# Patient Record
Sex: FEMALE | NOT HISPANIC OR LATINO | URBAN - METROPOLITAN AREA
[De-identification: names, ages, dates, MRNs, and addresses within clinical notes are randomized per-mention and may not be internally consistent; named-entity substitution may affect disease eponyms.]

---

## 2017-03-10 ENCOUNTER — OUTPATIENT (OUTPATIENT)
Dept: OUTPATIENT SERVICES | Facility: HOSPITAL | Age: 52
LOS: 1 days | Discharge: HOME | End: 2017-03-10

## 2017-03-10 DIAGNOSIS — C50.912 MALIGNANT NEOPLASM OF UNSPECIFIED SITE OF LEFT FEMALE BREAST: ICD-10-CM

## 2017-03-13 ENCOUNTER — RX RENEWAL (OUTPATIENT)
Age: 52
End: 2017-03-13

## 2017-03-24 ENCOUNTER — EMERGENCY (EMERGENCY)
Facility: HOSPITAL | Age: 52
LOS: 1 days | Discharge: HOME | End: 2017-03-24
Attending: EMERGENCY MEDICINE | Admitting: EMERGENCY MEDICINE
Payer: COMMERCIAL

## 2017-03-24 VITALS
HEART RATE: 98 BPM | SYSTOLIC BLOOD PRESSURE: 121 MMHG | DIASTOLIC BLOOD PRESSURE: 86 MMHG | RESPIRATION RATE: 18 BRPM | OXYGEN SATURATION: 99 % | TEMPERATURE: 98 F

## 2017-03-24 VITALS
HEIGHT: 68 IN | WEIGHT: 158.07 LBS | TEMPERATURE: 99 F | HEART RATE: 92 BPM | DIASTOLIC BLOOD PRESSURE: 84 MMHG | RESPIRATION RATE: 17 BRPM | OXYGEN SATURATION: 100 % | SYSTOLIC BLOOD PRESSURE: 164 MMHG

## 2017-03-24 DIAGNOSIS — M54.9 DORSALGIA, UNSPECIFIED: ICD-10-CM

## 2017-03-24 DIAGNOSIS — Z98.890 OTHER SPECIFIED POSTPROCEDURAL STATES: Chronic | ICD-10-CM

## 2017-03-24 PROCEDURE — 99283 EMERGENCY DEPT VISIT LOW MDM: CPT

## 2017-03-24 RX ORDER — IBUPROFEN 200 MG
600 TABLET ORAL ONCE
Qty: 0 | Refills: 0 | Status: COMPLETED | OUTPATIENT
Start: 2017-03-24 | End: 2017-03-24

## 2017-03-24 RX ORDER — TAMOXIFEN CITRATE 20 MG/1
0 TABLET, FILM COATED ORAL
Qty: 0 | Refills: 0 | COMMUNITY

## 2017-03-24 RX ORDER — ACETAMINOPHEN 500 MG
975 TABLET ORAL ONCE
Qty: 0 | Refills: 0 | Status: COMPLETED | OUTPATIENT
Start: 2017-03-24 | End: 2017-03-24

## 2017-03-24 RX ADMIN — Medication 600 MILLIGRAM(S): at 19:18

## 2017-03-24 RX ADMIN — Medication 975 MILLIGRAM(S): at 19:18
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
## 2017-03-24 NOTE — ED PROVIDER NOTE - PLAN OF CARE
You may take 600mg of ibuprofen (example: motrin or advil) every 4-6 hours for baseline pain control as indicated with respect to the warnings on the label. This is an over the counter medication.  You may take 1000mg of tylenol every 6 hours for baseline pain control with respect to the warnings on the label  Follow up with your primary care doctor within 48-72 hours.   You must return for new, worsening or concerning symptoms; specifically including those listed on the attached sheet.
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
## 2017-03-24 NOTE — ED PROVIDER NOTE - MUSCULOSKELETAL, MLM
Spine appears normal, range of motion is not limited, paraspinal muscle tenderness t10-l2 with no midline tend or step off.

## 2017-04-14 ENCOUNTER — OUTPATIENT (OUTPATIENT)
Dept: OUTPATIENT SERVICES | Facility: HOSPITAL | Age: 52
LOS: 1 days | Discharge: HOME | End: 2017-04-14

## 2017-04-14 DIAGNOSIS — C50.912 MALIGNANT NEOPLASM OF UNSPECIFIED SITE OF LEFT FEMALE BREAST: ICD-10-CM

## 2017-04-14 DIAGNOSIS — Z98.890 OTHER SPECIFIED POSTPROCEDURAL STATES: Chronic | ICD-10-CM

## 2017-04-18 ENCOUNTER — APPOINTMENT (OUTPATIENT)
Dept: HEMATOLOGY ONCOLOGY | Facility: CLINIC | Age: 52
End: 2017-04-18

## 2017-04-18 VITALS
DIASTOLIC BLOOD PRESSURE: 77 MMHG | TEMPERATURE: 98.4 F | RESPIRATION RATE: 16 BRPM | SYSTOLIC BLOOD PRESSURE: 123 MMHG | WEIGHT: 158.51 LBS | OXYGEN SATURATION: 96 % | HEART RATE: 74 BPM

## 2017-04-18 DIAGNOSIS — Z98.890 OTHER SPECIFIED POSTPROCEDURAL STATES: ICD-10-CM

## 2017-04-18 RX ORDER — APREMILAST 30 MG/1
30 TABLET, FILM COATED ORAL
Refills: 0 | Status: ACTIVE | COMMUNITY
Start: 2017-04-18

## 2017-10-19 ENCOUNTER — OUTPATIENT (OUTPATIENT)
Dept: OUTPATIENT SERVICES | Facility: HOSPITAL | Age: 52
LOS: 1 days | Discharge: HOME | End: 2017-10-19

## 2017-10-19 DIAGNOSIS — Z98.890 OTHER SPECIFIED POSTPROCEDURAL STATES: Chronic | ICD-10-CM

## 2017-10-19 DIAGNOSIS — C50.912 MALIGNANT NEOPLASM OF UNSPECIFIED SITE OF LEFT FEMALE BREAST: ICD-10-CM

## 2017-10-24 ENCOUNTER — APPOINTMENT (OUTPATIENT)
Dept: HEMATOLOGY ONCOLOGY | Facility: CLINIC | Age: 52
End: 2017-10-24
Payer: COMMERCIAL

## 2017-10-24 VITALS
WEIGHT: 162.04 LBS | HEART RATE: 84 BPM | OXYGEN SATURATION: 100 % | SYSTOLIC BLOOD PRESSURE: 116 MMHG | RESPIRATION RATE: 16 BRPM | TEMPERATURE: 98.6 F | DIASTOLIC BLOOD PRESSURE: 82 MMHG

## 2017-10-24 PROCEDURE — 99214 OFFICE O/P EST MOD 30 MIN: CPT

## 2017-10-24 RX ORDER — ALPRAZOLAM 0.25 MG/1
0.25 TABLET ORAL
Qty: 120 | Refills: 0 | Status: ACTIVE | COMMUNITY
Start: 2017-04-24

## 2017-10-24 RX ORDER — CIPROFLOXACIN HYDROCHLORIDE 500 MG/1
500 TABLET, FILM COATED ORAL
Qty: 10 | Refills: 0 | Status: DISCONTINUED | COMMUNITY
Start: 2017-04-24

## 2017-10-24 RX ORDER — AZITHROMYCIN 250 MG/1
250 TABLET, FILM COATED ORAL
Qty: 6 | Refills: 0 | Status: DISCONTINUED | COMMUNITY
Start: 2017-04-24

## 2018-04-06 ENCOUNTER — RX RENEWAL (OUTPATIENT)
Age: 53
End: 2018-04-06

## 2018-04-09 ENCOUNTER — RX RENEWAL (OUTPATIENT)
Age: 53
End: 2018-04-09

## 2018-04-20 ENCOUNTER — OUTPATIENT (OUTPATIENT)
Dept: OUTPATIENT SERVICES | Facility: HOSPITAL | Age: 53
LOS: 1 days | Discharge: HOME | End: 2018-04-20

## 2018-04-20 DIAGNOSIS — Z98.890 OTHER SPECIFIED POSTPROCEDURAL STATES: Chronic | ICD-10-CM

## 2018-04-20 DIAGNOSIS — C50.912 MALIGNANT NEOPLASM OF UNSPECIFIED SITE OF LEFT FEMALE BREAST: ICD-10-CM

## 2018-04-24 ENCOUNTER — APPOINTMENT (OUTPATIENT)
Dept: HEMATOLOGY ONCOLOGY | Facility: CLINIC | Age: 53
End: 2018-04-24
Payer: COMMERCIAL

## 2018-04-24 VITALS
OXYGEN SATURATION: 100 % | TEMPERATURE: 98.6 F | HEART RATE: 82 BPM | DIASTOLIC BLOOD PRESSURE: 70 MMHG | WEIGHT: 163.14 LBS | RESPIRATION RATE: 16 BRPM | SYSTOLIC BLOOD PRESSURE: 120 MMHG

## 2018-04-24 PROCEDURE — 99214 OFFICE O/P EST MOD 30 MIN: CPT

## 2018-07-16 PROBLEM — C50.919 MALIGNANT NEOPLASM OF UNSPECIFIED SITE OF UNSPECIFIED FEMALE BREAST: Chronic | Status: ACTIVE | Noted: 2017-03-24

## 2018-10-04 ENCOUNTER — OUTPATIENT (OUTPATIENT)
Dept: OUTPATIENT SERVICES | Facility: HOSPITAL | Age: 53
LOS: 1 days | Discharge: HOME | End: 2018-10-04

## 2018-10-04 DIAGNOSIS — Z98.890 OTHER SPECIFIED POSTPROCEDURAL STATES: Chronic | ICD-10-CM

## 2018-10-04 DIAGNOSIS — C50.912 MALIGNANT NEOPLASM OF UNSPECIFIED SITE OF LEFT FEMALE BREAST: ICD-10-CM

## 2018-10-12 ENCOUNTER — APPOINTMENT (OUTPATIENT)
Dept: HEMATOLOGY ONCOLOGY | Facility: CLINIC | Age: 53
End: 2018-10-12
Payer: COMMERCIAL

## 2018-10-12 VITALS
HEART RATE: 70 BPM | DIASTOLIC BLOOD PRESSURE: 85 MMHG | TEMPERATURE: 98.7 F | OXYGEN SATURATION: 99 % | WEIGHT: 163.14 LBS | SYSTOLIC BLOOD PRESSURE: 132 MMHG | RESPIRATION RATE: 16 BRPM

## 2018-10-12 PROCEDURE — 99213 OFFICE O/P EST LOW 20 MIN: CPT

## 2019-02-04 ENCOUNTER — RX RENEWAL (OUTPATIENT)
Age: 54
End: 2019-02-04

## 2019-03-11 ENCOUNTER — RX RENEWAL (OUTPATIENT)
Age: 54
End: 2019-03-11

## 2019-04-05 ENCOUNTER — OUTPATIENT (OUTPATIENT)
Dept: OUTPATIENT SERVICES | Facility: HOSPITAL | Age: 54
LOS: 1 days | Discharge: HOME | End: 2019-04-05

## 2019-04-05 DIAGNOSIS — Z98.890 OTHER SPECIFIED POSTPROCEDURAL STATES: Chronic | ICD-10-CM

## 2019-04-05 DIAGNOSIS — C50.912 MALIGNANT NEOPLASM OF UNSPECIFIED SITE OF LEFT FEMALE BREAST: ICD-10-CM

## 2019-04-16 ENCOUNTER — APPOINTMENT (OUTPATIENT)
Dept: HEMATOLOGY ONCOLOGY | Facility: CLINIC | Age: 54
End: 2019-04-16
Payer: COMMERCIAL

## 2019-04-16 VITALS
TEMPERATURE: 98.6 F | RESPIRATION RATE: 16 BRPM | WEIGHT: 164.9 LBS | OXYGEN SATURATION: 99 % | HEART RATE: 77 BPM | SYSTOLIC BLOOD PRESSURE: 129 MMHG | DIASTOLIC BLOOD PRESSURE: 89 MMHG

## 2019-04-16 PROCEDURE — 99213 OFFICE O/P EST LOW 20 MIN: CPT

## 2019-04-16 RX ORDER — MAGNESIUM OXIDE 400 MG
400 (241.3 MG) TABLET ORAL
Refills: 0 | Status: ACTIVE | COMMUNITY
Start: 2019-04-16

## 2019-04-16 RX ORDER — CALCIPOTRIENE AND BETAMETHASONE DIPROPIONATE 50; .5 UG/G; MG/G
0.005-0.064 OINTMENT TOPICAL
Refills: 0 | Status: ACTIVE | COMMUNITY
Start: 2019-04-16

## 2019-04-16 RX ORDER — CLOBETASOL PROPIONATE 0.5 MG/G
0.05 CREAM TOPICAL
Refills: 0 | Status: ACTIVE | COMMUNITY
Start: 2019-04-16

## 2019-04-16 NOTE — OB HISTORY
[Menarche Age: ____] : age at menarche was [unfilled] [Definite:  ___ (Date)] : the last menstrual period was [unfilled] [Menopause Age: ____] : age at menopause was [unfilled] [___] : Full Term: [unfilled]

## 2019-04-19 NOTE — PHYSICAL EXAM
[Fully active, able to carry on all pre-disease performance without restriction] : Status 0 - Fully active, able to carry on all pre-disease performance without restriction [Normal] : grossly intact [de-identified] : Right eye mydriasis  [de-identified] : Areas of psoriasis and a couple of patches of eczema

## 2019-04-19 NOTE — HISTORY OF PRESENT ILLNESS
[Disease: _____________________] : Disease: [unfilled] [T: ___] : T[unfilled] [N: ___] : N[unfilled] [M: ___] : M[unfilled] [AJCC Stage: ____] : AJCC Stage: [unfilled] [de-identified] : Left breast cancer at 49\par 6/25/14 Left lumpectomy and SLN\par s/p Radiation\par 11/14 started Tamoxifen \par 2014-My Gene genetic panel negative including BRCA [de-identified] : DCIS with minute focus of microinvasion, 4 SLN negative, ER 20% CA negative [de-identified] : Lindsey continues to do well on tamoxifen which she has been on since 11/14, aside from the occasional lower extremity muscle cramps which are better with start of magnesium supplementations. The hot flashes are more sporadic and have been less intense in nature.\par LMP 10/18 and nothing since, cycles are now very irregular with  typical bleeding duration (not too heavy)\par Her psoriasis has been under control with Otezla and two topical creams, she was however referred to see a rheumatologist as she may have some arthritic component of the psoriasis that needs to be managed. \par \par Routine Health Maintenance:\par Mammogram and breast ultrasound:05/18 MICHAEL\par MRI of the breast: 05/18 stable post lumpectomy changes in the left breast. No evidence of malignancy in either breast. \par Pap Smear: 7/18 MICHAEL\par Colonoscopy: 2014 with no polyps next follow up 8/22/19 Otezla may effect the colon as per Dermatology\par \par \par

## 2019-08-30 ENCOUNTER — RX RENEWAL (OUTPATIENT)
Age: 54
End: 2019-08-30

## 2019-10-12 ENCOUNTER — OUTPATIENT (OUTPATIENT)
Dept: OUTPATIENT SERVICES | Facility: HOSPITAL | Age: 54
LOS: 1 days | Discharge: HOME | End: 2019-10-12

## 2019-10-12 DIAGNOSIS — Z98.890 OTHER SPECIFIED POSTPROCEDURAL STATES: Chronic | ICD-10-CM

## 2019-10-12 DIAGNOSIS — C50.912 MALIGNANT NEOPLASM OF UNSPECIFIED SITE OF LEFT FEMALE BREAST: ICD-10-CM

## 2019-10-18 ENCOUNTER — APPOINTMENT (OUTPATIENT)
Dept: HEMATOLOGY ONCOLOGY | Facility: CLINIC | Age: 54
End: 2019-10-18
Payer: COMMERCIAL

## 2019-10-18 VITALS
WEIGHT: 162.04 LBS | OXYGEN SATURATION: 99 % | DIASTOLIC BLOOD PRESSURE: 80 MMHG | SYSTOLIC BLOOD PRESSURE: 118 MMHG | TEMPERATURE: 98.5 F | RESPIRATION RATE: 16 BRPM | HEART RATE: 75 BPM

## 2019-10-18 DIAGNOSIS — L40.9 PSORIASIS, UNSPECIFIED: ICD-10-CM

## 2019-10-18 DIAGNOSIS — R25.2 CRAMP AND SPASM: ICD-10-CM

## 2019-10-18 PROCEDURE — 99214 OFFICE O/P EST MOD 30 MIN: CPT

## 2019-10-18 NOTE — PHYSICAL EXAM
[de-identified] : Right eye mydriasis  [de-identified] : Areas of psoriasis and a couple of patches of eczema

## 2019-10-18 NOTE — HISTORY OF PRESENT ILLNESS
[de-identified] : Left breast cancer at 49\par 6/25/14 Left lumpectomy and SLN\par s/p Radiation\par 2014 My Gene genetic panel negative including BRCA\par 11/14 -11/19 Tamoxifen for five years\par  [de-identified] : DCIS with minute focus of microinvasion, 4 SLN negative, ER 20% WA negative [de-identified] : Lindsey continues to do well on tamoxifen which she has been on since 11/14. She rarely gets the hot flashes now and only has occasional lower extremity muscle cramps which are better with magnesium supplementation.\par LMP 10/18 and one more in 7/19 which was very light.\par Her psoriasis has been under control with Otezla and two topical creams, but continues to flare at times with stress.\par She is  with two stepchildren.\par \par Routine Health Maintenance:\par Mammogram and breast ultrasound:05/19 MICHAEL\par MRI of the breast: 05/18 stable post lumpectomy changes in the left breast. No evidence of malignancy in either breast. \par Pap Smear: 7/19 MICHAEL\par Colonoscopy: 2014 with no polyps; 9/2019 no polyps\par \par \par

## 2019-12-16 ENCOUNTER — APPOINTMENT (OUTPATIENT)
Dept: SPINE | Facility: CLINIC | Age: 54
End: 2019-12-16
Payer: COMMERCIAL

## 2019-12-16 VITALS
DIASTOLIC BLOOD PRESSURE: 74 MMHG | BODY MASS INDEX: 24.55 KG/M2 | OXYGEN SATURATION: 99 % | WEIGHT: 162 LBS | HEART RATE: 82 BPM | HEIGHT: 68 IN | TEMPERATURE: 97.8 F | SYSTOLIC BLOOD PRESSURE: 116 MMHG

## 2019-12-16 DIAGNOSIS — Z78.9 OTHER SPECIFIED HEALTH STATUS: ICD-10-CM

## 2019-12-16 DIAGNOSIS — Z72.89 OTHER PROBLEMS RELATED TO LIFESTYLE: ICD-10-CM

## 2019-12-16 DIAGNOSIS — M54.42 LUMBAGO WITH SCIATICA, LEFT SIDE: ICD-10-CM

## 2019-12-16 DIAGNOSIS — M54.5 LOW BACK PAIN: ICD-10-CM

## 2019-12-16 DIAGNOSIS — Z85.9 PERSONAL HISTORY OF MALIGNANT NEOPLASM, UNSPECIFIED: ICD-10-CM

## 2019-12-16 PROCEDURE — 99203 OFFICE O/P NEW LOW 30 MIN: CPT

## 2019-12-16 NOTE — ASSESSMENT
[FreeTextEntry1] : 55 yo female with a small disc bulge and unlikely to be causing her lower spine pain.  She was encouraged to continue conservative measures and obtain a scoliosis image .  Normal neurological exam. No surgery recommended at the present time.  She will return after scoliosis images.
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
## 2019-12-16 NOTE — PHYSICAL EXAM
[Person] : oriented to person [Place] : oriented to place [Time] : oriented to time [Short Term Intact] : short term memory intact [Remote Intact] : remote memory intact [Span Intact] : the attention span was normal [Concentration Intact] : normal concentrating ability [Fluency] : fluency intact [Comprehension] : comprehension intact [Current Events] : adequate knowledge of current events [Past History] : adequate knowledge of personal past history [Vocabulary] : adequate range of vocabulary [Cranial Nerves Optic (II)] : visual acuity intact bilaterally,  pupils equal round and reactive to light [Cranial Nerves Oculomotor (III)] : extraocular motion intact [Cranial Nerves Trigeminal (V)] : facial sensation intact symmetrically [Cranial Nerves Facial (VII)] : face symmetrical [Cranial Nerves Vestibulocochlear (VIII)] : hearing was intact bilaterally [Cranial Nerves Glossopharyngeal (IX)] : tongue and palate midline [Cranial Nerves Accessory (XI - Cranial And Spinal)] : head turning and shoulder shrug symmetric [Cranial Nerves Hypoglossal (XII)] : there was no tongue deviation with protrusion [Motor Tone] : muscle tone was normal in all four extremities [Motor Strength] : muscle strength was normal in all four extremities [No Muscle Atrophy] : normal bulk in all four extremities [Sensation Tactile Decrease] : light touch was intact [Abnormal Walk] : normal gait [Past-pointing] : there was no past-pointing [Balance] : balance was intact [Tremor] : no tremor present [2+] : Ankle jerk left 2+ [Plantar Reflex Right Only] : normal on the right [Plantar Reflex Left Only] : normal on the left [___] : absent on the right [___] : absent on the left [Pearson] : Pearson's sign was not demonstrated [No Tenderness to Palpation] : no spine tenderness on palpation [Full ROM] : full ROM [No Pain with ROM] : no pain with motion in any direction [Straight-Leg Raise Test - Right] : straight leg raise  of the right leg was negative [Able to toe walk] : the patient was able to toe walk [Straight-Leg Raise Test - Left] : straight leg raise of the left leg was negative [Able to heel walk] : the patient was able to heel walk

## 2019-12-16 NOTE — DATA REVIEWED
[de-identified] : Lumbar MRI from [facility] 11/5/2019 [de-identified] : Lumbar Xray from 2017 at [facility] [de-identified] : EMG of LE from 2017

## 2019-12-16 NOTE — REASON FOR VISIT
[New Patient Visit] : a new patient visit [Referred By: _________] : Patient was referred by JENNY [Family Member] : family member [FreeTextEntry1] : Lumbar radiculopathy

## 2019-12-16 NOTE — CONSULT LETTER
[Dear  ___] : Dear  [unfilled], [Consult Letter:] : I had the pleasure of evaluating your patient, [unfilled]. [Please see my note below.] : Please see my note below. [Consult Closing:] : Thank you very much for allowing me to participate in the care of this patient.  If you have any questions, please do not hesitate to contact me. [Sincerely,] : Sincerely, [FreeTextEntry2] : Adams Rojas MD\par [address] [place] [address]\par [place], [place]   [FreeTextEntry3] : Meir Low MD\par Chief of Neurosurgery [facility]\par [facility]\par

## 2020-01-16 ENCOUNTER — APPOINTMENT (OUTPATIENT)
Dept: SPINE | Facility: CLINIC | Age: 55
End: 2020-01-16

## 2020-01-17 ENCOUNTER — TRANSCRIPTION ENCOUNTER (OUTPATIENT)
Age: 55
End: 2020-01-17

## 2020-05-21 ENCOUNTER — TRANSCRIPTION ENCOUNTER (OUTPATIENT)
Age: 55
End: 2020-05-21

## 2020-10-22 ENCOUNTER — TRANSCRIPTION ENCOUNTER (OUTPATIENT)
Age: 55
End: 2020-10-22

## 2020-11-06 ENCOUNTER — TRANSCRIPTION ENCOUNTER (OUTPATIENT)
Age: 55
End: 2020-11-06

## 2020-11-09 ENCOUNTER — TRANSCRIPTION ENCOUNTER (OUTPATIENT)
Age: 55
End: 2020-11-09

## 2021-10-13 ENCOUNTER — TRANSCRIPTION ENCOUNTER (OUTPATIENT)
Age: 56
End: 2021-10-13

## 2022-01-14 ENCOUNTER — TRANSCRIPTION ENCOUNTER (OUTPATIENT)
Age: 57
End: 2022-01-14

## 2022-01-19 ENCOUNTER — TRANSCRIPTION ENCOUNTER (OUTPATIENT)
Age: 57
End: 2022-01-19

## 2022-09-14 ENCOUNTER — NON-APPOINTMENT (OUTPATIENT)
Age: 57
End: 2022-09-14

## 2023-02-10 ENCOUNTER — APPOINTMENT (OUTPATIENT)
Dept: PLASTIC SURGERY | Facility: CLINIC | Age: 58
End: 2023-02-10
Payer: COMMERCIAL

## 2023-02-10 PROCEDURE — 99213 OFFICE O/P EST LOW 20 MIN: CPT

## 2023-02-10 NOTE — PHYSICAL EXAM
[Normocephalic] : normocephalic [Atraumatic] : atraumatic [EOMI] : extra ocular movement intact [Sclera nonicteric] : sclera nonicteric [Supple] : supple [No Supraclavicular Adenopathy] : no supraclavicular adenopathy [No Cervical Adenopathy] : no cervical adenopathy [No Thyromegaly] : no thyromegaly [Examined in the supine and seated position] : examined in the supine and seated position [No dominant masses] : no dominant masses in right breast  [No dominant masses] : no dominant masses left breast [No Nipple Retraction] : no left nipple retraction [No Nipple Discharge] : no left nipple discharge [No Axillary Lymphadenopathy] : no left axillary lymphadenopathy [No Edema] : no edema [No Rashes] : no rashes [No Ulceration] : no ulceration [Asymmetrical] : asymmetrical [de-identified] : Right breast is larger than the left.  [de-identified] : Left breast and axillary incisions well healed.

## 2023-02-10 NOTE — ASSESSMENT
[FreeTextEntry1] : history of left breast cancer.\par No evidence of disease recurrence \par \par 1. Annual bilateral mammogram and breast ultrasound due 7/2023. Rx provided. \par 2. Follow up office visit due 8/2023. \par 3. List of bra boutiques provided. \par 4. Advised monthly self breast examinations and advised her to contact me if she has any concerns.  wine/liquor/beer/"Socially"

## 2023-02-10 NOTE — CONSULT LETTER
[Dear  ___] : Dear  [unfilled], [Courtesy Letter:] : I had the pleasure of seeing your patient, [unfilled], in my office today. [Please see my note below.] : Please see my note below. [Sincerely,] : Sincerely, [DrCindy  ___] : Dr. ALVARADO

## 2023-02-10 NOTE — HISTORY OF PRESENT ILLNESS
[FreeTextEntry1] : 57 year old female presents breast cancer recurrence surveillance.\par She has a history of left breast cancer (Stage 1A, 6/2014); s/p left lumpectomy, left axillary sentinel LN biopsy (6/18/14) then left re-excision lumpectomy (7/16/2014)\par She has a family history of breast cancer in her mother (81), sister (51), and maternal grandmother (80's). She has a family history of uterine cancer in a different sister (53) and prostate cancer in a cousin (51).\par 7/12/2021 Bilateral mammogram: There is motion on the left mL of view. Recommend repeat left mL of view at no additional cost. No significant masses, calcifications, or other findings are seen in either breast within this limitation. There are post treatment changes left breast. BI-RADS 0. \par Bilateral breast ultrasound: Post surgical changes Left 2:00.\par 8/05/2021 Left diagnostic mammogram: negative, BI-RADS 2.\par 7/18/2022 Bilateral mammogram: There are scattered areas of fibroglandular density. There is a stable post lumpectomy scar in the central left breast. No significant masses or calcifications are identified. Nonenlarged bilateral axillary lymph nodes are present. BI-RADS 2.\par Bilateral breast ultrasound: Right breast - No cystic or solid lesions identified.\par Left breast - 2:00, 5cm from the nipple, stable area of post lumpectomy scar. No cystic or solid lesions identified. There are no enlarged axillary lymph nodes on either side.\par There is no mammographic or sonographic evidence of malignancy in either breast. BI-RADS 1.\par ONC: Dr. Amezcua, completed 5 years of Tamoxifen 10/2019; she no longer sees her.\par XRT: Dr. Gee: s/p XRT\par No change in health history since last office visit.\par No new breast concerns.

## 2023-05-22 ENCOUNTER — NON-APPOINTMENT (OUTPATIENT)
Age: 58
End: 2023-05-22

## 2023-07-31 ENCOUNTER — NON-APPOINTMENT (OUTPATIENT)
Age: 58
End: 2023-07-31

## 2023-08-03 ENCOUNTER — APPOINTMENT (OUTPATIENT)
Dept: PLASTIC SURGERY | Facility: CLINIC | Age: 58
End: 2023-08-03
Payer: COMMERCIAL

## 2023-08-04 ENCOUNTER — NON-APPOINTMENT (OUTPATIENT)
Age: 58
End: 2023-08-04

## 2023-08-04 DIAGNOSIS — R92.8 OTHER ABNORMAL AND INCONCLUSIVE FINDINGS ON DIAGNOSTIC IMAGING OF BREAST: ICD-10-CM

## 2023-09-06 ENCOUNTER — APPOINTMENT (OUTPATIENT)
Dept: PLASTIC SURGERY | Facility: CLINIC | Age: 58
End: 2023-09-06
Payer: COMMERCIAL

## 2023-09-06 VITALS
SYSTOLIC BLOOD PRESSURE: 136 MMHG | HEIGHT: 68 IN | TEMPERATURE: 97.6 F | OXYGEN SATURATION: 100 % | BODY MASS INDEX: 24.71 KG/M2 | DIASTOLIC BLOOD PRESSURE: 90 MMHG | WEIGHT: 163 LBS | HEART RATE: 61 BPM

## 2023-09-06 DIAGNOSIS — Z80.3 FAMILY HISTORY OF MALIGNANT NEOPLASM OF BREAST: ICD-10-CM

## 2023-09-06 PROCEDURE — 99213 OFFICE O/P EST LOW 20 MIN: CPT

## 2023-09-06 NOTE — PHYSICAL EXAM
[de-identified] : Right breast is larger than the left.  [de-identified] : Left breast and axillary incisions well healed.

## 2023-09-06 NOTE — ASSESSMENT
[FreeTextEntry1] : history of left breast cancer (Stage IA, 6/2014) No evidence of disease recurrence   1. Annual bilateral mammogram and breast ultrasound due 7/2024 2. Follow up office visit due 3/2024 3. Advised monthly self breast examinations and advised her to contact me if she has any concerns.

## 2023-09-06 NOTE — CONSULT LETTER
[FreeTextEntry3] : Mirlande Mckeon, RPA-C Breast Surgery [address] [place] [address], [place] [address] (Phone) [phone] (Fax) [phone]

## 2023-09-06 NOTE — HISTORY OF PRESENT ILLNESS
[FreeTextEntry1] : Patient is a 58 year old female here today for a follow up visit.  She has a history of left breast cancer (Stage 1A, 6/2014); s/p left lumpectomy, left axillary sentinel LN biopsy (6/18/14) then left re-excision lumpectomy (7/16/2014) She has a family history of breast cancer in her mother (81), sister (51), sister (58) and maternal grandmother (80's). She has a family history of uterine cancer in a different sister (53), eye cancer in a different sister and prostate cancer in a cousin (51). 7/12/2021 Bilateral mammogram: There is motion on the left mL of view. Recommend repeat left mL of view at no additional cost. No significant masses, calcifications, or other findings are seen in either breast within this limitation. There are post treatment changes left breast. BI-RADS 0.  Bilateral breast ultrasound: Post surgical changes Left 2:00. 8/05/2021 Left diagnostic mammogram: negative, BI-RADS 2. 7/28/2023 Bilateral mammogram: There are scattered areas of fibroglandular density. Stable post surgical changes left breast. right asymmetry at the level of the nipple, posterior depth. Bilateral breast ultrasound: Left stable lumpectomy scar. BI-RADS 0, advise left diagnostic mammogram with targeted ultrasound 8/8/2023 Left diagnostic ultrasound: Asymmetry in the right central breast persists with additional imaging but appears to demonstrate long-term stability from 2018 Left targeted ultrasound: Negative.  BI-RADS 2 ONC: Dr. Amezcua, completed 5 years of Tamoxifen 10/2019; she no longer sees her. XRT: Dr. Gee: s/p XRT She denies any current breast concerns

## 2024-03-25 ENCOUNTER — NON-APPOINTMENT (OUTPATIENT)
Age: 59
End: 2024-03-25

## 2024-03-26 ENCOUNTER — APPOINTMENT (OUTPATIENT)
Dept: PLASTIC SURGERY | Facility: CLINIC | Age: 59
End: 2024-03-26
Payer: COMMERCIAL

## 2024-03-26 VITALS
HEIGHT: 68 IN | BODY MASS INDEX: 24.71 KG/M2 | TEMPERATURE: 98 F | HEART RATE: 69 BPM | SYSTOLIC BLOOD PRESSURE: 121 MMHG | DIASTOLIC BLOOD PRESSURE: 77 MMHG | WEIGHT: 163 LBS | OXYGEN SATURATION: 95 %

## 2024-03-26 DIAGNOSIS — C50.912 MALIGNANT NEOPLASM OF UNSPECIFIED SITE OF LEFT FEMALE BREAST: ICD-10-CM

## 2024-03-26 PROCEDURE — 99213 OFFICE O/P EST LOW 20 MIN: CPT

## 2024-03-26 NOTE — HISTORY OF PRESENT ILLNESS
[FreeTextEntry1] : Patient is a 58 year old female here today for a follow up visit. She has a history of left breast cancer (Stage 1A, 6/2014); s/p left lumpectomy, left axillary sentinel LN biopsy (6/18/14) then left re-excision lumpectomy (7/16/2014) She has a family history of breast cancer in her mother (89), sister (51), sister (58) and maternal grandmother (80's). She has a family history of uterine cancer in a different sister (53), eye cancer in a different sister and prostate cancer in a cousin (51). 7/12/2021 Bilateral mammogram: There is motion on the left ML of view. Recommend repeat left ML of view at no additional cost. No significant masses, calcifications, or other findings are seen in either breast within this limitation. There are post treatment changes left breast. BI-RADS 0. Bilateral breast ultrasound: Post surgical changes Left 2:00. 8/05/2021 Left diagnostic mammogram: negative, BI-RADS 2. 7/28/2023 Bilateral mammogram: There are scattered areas of fibroglandular density. Stable post surgical changes left breast. right asymmetry at the level of the nipple, posterior depth. Bilateral breast ultrasound: Left stable lumpectomy scar. BI-RADS 0, advise left diagnostic mammogram with targeted ultrasound 8/8/2023 Left diagnostic ultrasound: Asymmetry in the right central breast persists with additional imaging but appears to demonstrate long-term stability from 2018 Left targeted ultrasound: Negative. BI-RADS 2 ONC: Dr. Amezcua, completed 5 years of Tamoxifen 10/2019; she no longer sees her. XRT: Dr. 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 She will undergo and excision and closure with Dr. Conklin and Dr. Mabry

## 2024-03-26 NOTE — CONSULT LETTER
[Courtesy Letter:] : I had the pleasure of seeing your patient, [unfilled], in my office today. [Dear  ___] : Dear  [unfilled], [Sincerely,] : Sincerely, [Please see my note below.] : Please see my note below. [DrCindy  ___] : Dr. ALVARADO [FreeTextEntry3] : Susan M. Palleschi, MD, FACS Division of Breast Surgery Director, Breast Surgery [address] [address] (Phone) [phone] (Fax) [phone]

## 2024-03-26 NOTE — PHYSICAL EXAM
[Atraumatic] : atraumatic [Normocephalic] : normocephalic [Sclera nonicteric] : sclera nonicteric [EOMI] : extra ocular movement intact [No Supraclavicular Adenopathy] : no supraclavicular adenopathy [Supple] : supple [No Cervical Adenopathy] : no cervical adenopathy [No Thyromegaly] : no thyromegaly [Examined in the supine and seated position] : examined in the supine and seated position [No dominant masses] : no dominant masses in right breast  [Asymmetrical] : asymmetrical [No dominant masses] : no dominant masses left breast [No Nipple Retraction] : no left nipple retraction [No Nipple Discharge] : no left nipple discharge [No Axillary Lymphadenopathy] : no left axillary lymphadenopathy [No Edema] : no edema [No Ulceration] : no ulceration [No Rashes] : no rashes [de-identified] : Right breast is larger than the left.  [de-identified] : Left breast and axillary incisions well healed.

## 2024-03-26 NOTE — ASSESSMENT
[FreeTextEntry1] : history of left breast cancer (Stage IA, 6/2014) No evidence of disease recurrence   1. Annual bilateral mammogram and breast ultrasound due 7/2024 2. Follow up office visit due 9/2024 3. Advised monthly self breast examinations and advised her to contact me if she has any concerns.

## 2024-09-05 ENCOUNTER — APPOINTMENT (OUTPATIENT)
Dept: MAMMOGRAPHY | Facility: CLINIC | Age: 59
End: 2024-09-05

## 2024-09-05 ENCOUNTER — APPOINTMENT (OUTPATIENT)
Dept: ULTRASOUND IMAGING | Facility: CLINIC | Age: 59
End: 2024-09-05

## 2024-09-25 ENCOUNTER — APPOINTMENT (OUTPATIENT)
Dept: PLASTIC SURGERY | Facility: CLINIC | Age: 59
End: 2024-09-25
Payer: COMMERCIAL

## 2024-09-25 VITALS
SYSTOLIC BLOOD PRESSURE: 127 MMHG | OXYGEN SATURATION: 98 % | WEIGHT: 163 LBS | DIASTOLIC BLOOD PRESSURE: 85 MMHG | HEIGHT: 68 IN | TEMPERATURE: 97.9 F | BODY MASS INDEX: 24.71 KG/M2 | HEART RATE: 75 BPM

## 2024-09-25 DIAGNOSIS — C50.912 MALIGNANT NEOPLASM OF UNSPECIFIED SITE OF LEFT FEMALE BREAST: ICD-10-CM

## 2024-09-25 PROCEDURE — 99213 OFFICE O/P EST LOW 20 MIN: CPT

## 2024-09-25 NOTE — ASSESSMENT
[FreeTextEntry1] : history of left breast cancer (Stage IA, 6/2014) No evidence of disease recurrence   1. Annual bilateral mammogram and breast ultrasound due 8/2025 2. Follow up office visit due in 1 year 3. Advised monthly self breast examinations and advised her to contact me if she has any concerns.

## 2024-09-25 NOTE — CONSULT LETTER
[Dear  ___] : Dear  [unfilled], [Courtesy Letter:] : I had the pleasure of seeing your patient, [unfilled], in my office today. [Please see my note below.] : Please see my note below. [Sincerely,] : Sincerely, [DrCindy  ___] : Dr. ALVARADO [FreeTextEntry3] : Mirlande Mckeon, RPA-C Breast Surgery [address] [place] [address], [place] [address] (Phone) [phone] (Fax) [phone]

## 2024-09-25 NOTE — PHYSICAL EXAM
[Normocephalic] : normocephalic [Atraumatic] : atraumatic [EOMI] : extra ocular movement intact [Sclera nonicteric] : sclera nonicteric [Supple] : supple [No Supraclavicular Adenopathy] : no supraclavicular adenopathy [Examined in the supine and seated position] : examined in the supine and seated position [Asymmetrical] : asymmetrical [No dominant masses] : no dominant masses in right breast  [No dominant masses] : no dominant masses left breast [No Nipple Retraction] : no left nipple retraction [No Nipple Discharge] : no left nipple discharge [No Axillary Lymphadenopathy] : no left axillary lymphadenopathy [No Edema] : no edema [No Rashes] : no rashes [No Ulceration] : no ulceration [de-identified] : Right breast is larger than the left.  [de-identified] : Left breast and axillary incisions well healed.

## 2024-09-25 NOTE — HISTORY OF PRESENT ILLNESS
[FreeTextEntry1] : Patient is a 59 year old female here today for a follow up visit. She has a history of left breast cancer (Stage 1A, 6/2014); s/p left lumpectomy, left axillary sentinel LN biopsy (6/18/14) then left re-excision lumpectomy (7/16/2014) She has a family history of breast cancer in her mother (89), sister (51), sister (58) and maternal grandmother (80's). She has a family history of uterine cancer in a different sister (53), eye cancer in a different sister and prostate cancer in a cousin (51). ONC: Dr. Amezcua, completed 5 years of Tamoxifen 10/2019; she no longer sees her. XRT: Dr. Gee: s/p XRT 8/13/2024 Bilateral mammogram: There are scattered areas of fibroglandular density. There are stable postsurgical changes of the left breast. Bilateral ultrasound: Hypoechoic scarring in the left breast 2:00 (N5cm). BI-RADS 2 She denies any current breast concerns  Up to date with MDs

## 2025-04-22 NOTE — ED ADULT NURSE NOTE - HARM RISK FACTORS
Patient Name:  Brooke Mendez  YOB: 1982  Patient Age:  42 y.o.  Patient's Sex:  female    Date of Service:  04/22/2025    Provider:  ELVIS Baker MD                     RESEARCH NOTE                 Protocol --ICE COMPRESS: Randomized Trial of Limb Cryocompression Versus Continuous Compression Versus Low Cyclic Compression for the Prevention of Taxane-Induced Peripheral Neuropathy   NCT #[id number]     Subject ID: [id number]  ARM 2:  Continuous Compression       Participant here for C5 of taxane therapy.   Planned Taxol/Cb qwk x 12 with Keytruda q3wks. However, Keytruda was discontinued r/t rash. Taxol was changed to Abraxane due to reaction as well.  Today is third dose of Abraxane.    Pt states rash continues to be resolved.  Port in place. ?Device used on all extremities except RA which pt refused today.   Upon assessment, there were no signs of redness, open wounds or break in skin integrity prior to start of device intervention.   Participant tolerated device intervention without any adjustments. ?No AEs observed.     Prior to start of treatment, neuropathy assessments completed. Timed Get Up and Go evaluation completed.  Participant agreed to participate in the optional blood collection and biobanking.  Specimens collected and processed per protocol.      Next appt planned for 4/2/925 for C6 of taxane treatment.     Thank you.     DEVON Velazquez, BSN, RN, RNC  
no

## 2025-06-23 ENCOUNTER — NON-APPOINTMENT (OUTPATIENT)
Age: 60
End: 2025-06-23

## 2025-06-24 ENCOUNTER — APPOINTMENT (OUTPATIENT)
Dept: ORTHOPEDIC SURGERY | Facility: CLINIC | Age: 60
End: 2025-06-24
Payer: COMMERCIAL

## 2025-06-24 VITALS — WEIGHT: 140 LBS | BODY MASS INDEX: 21.22 KG/M2 | HEIGHT: 68 IN

## 2025-06-24 PROBLEM — M25.561 BILATERAL KNEE PAIN: Status: ACTIVE | Noted: 2025-06-24

## 2025-06-24 PROBLEM — M23.91 INTERNAL DERANGEMENT OF RIGHT KNEE: Status: ACTIVE | Noted: 2025-06-24

## 2025-06-24 PROCEDURE — 99203 OFFICE O/P NEW LOW 30 MIN: CPT

## 2025-06-24 PROCEDURE — 73564 X-RAY EXAM KNEE 4 OR MORE: CPT | Mod: 50

## 2025-08-25 ENCOUNTER — APPOINTMENT (OUTPATIENT)
Dept: ULTRASOUND IMAGING | Facility: IMAGING CENTER | Age: 60
End: 2025-08-25
Payer: COMMERCIAL

## 2025-08-25 ENCOUNTER — RESULT REVIEW (OUTPATIENT)
Age: 60
End: 2025-08-25

## 2025-08-25 ENCOUNTER — APPOINTMENT (OUTPATIENT)
Dept: MAMMOGRAPHY | Facility: IMAGING CENTER | Age: 60
End: 2025-08-25
Payer: COMMERCIAL

## 2025-08-25 ENCOUNTER — OUTPATIENT (OUTPATIENT)
Dept: OUTPATIENT SERVICES | Facility: HOSPITAL | Age: 60
LOS: 1 days | End: 2025-08-25
Payer: COMMERCIAL

## 2025-08-25 DIAGNOSIS — Z98.890 OTHER SPECIFIED POSTPROCEDURAL STATES: Chronic | ICD-10-CM

## 2025-08-25 DIAGNOSIS — Z00.8 ENCOUNTER FOR OTHER GENERAL EXAMINATION: ICD-10-CM

## 2025-08-25 PROCEDURE — 77067 SCR MAMMO BI INCL CAD: CPT | Mod: 26

## 2025-08-25 PROCEDURE — 77063 BREAST TOMOSYNTHESIS BI: CPT | Mod: 26

## 2025-08-25 PROCEDURE — 76641 ULTRASOUND BREAST COMPLETE: CPT | Mod: 26,50

## 2025-08-25 PROCEDURE — 77067 SCR MAMMO BI INCL CAD: CPT

## 2025-08-25 PROCEDURE — 76641 ULTRASOUND BREAST COMPLETE: CPT

## 2025-08-25 PROCEDURE — 77063 BREAST TOMOSYNTHESIS BI: CPT

## 2025-08-27 ENCOUNTER — NON-APPOINTMENT (OUTPATIENT)
Age: 60
End: 2025-08-27

## 2025-09-05 ENCOUNTER — APPOINTMENT (OUTPATIENT)
Dept: ULTRASOUND IMAGING | Facility: IMAGING CENTER | Age: 60
End: 2025-09-05
Payer: COMMERCIAL

## 2025-09-05 ENCOUNTER — RESULT REVIEW (OUTPATIENT)
Age: 60
End: 2025-09-05

## 2025-09-05 ENCOUNTER — NON-APPOINTMENT (OUTPATIENT)
Age: 60
End: 2025-09-05

## 2025-09-05 ENCOUNTER — OUTPATIENT (OUTPATIENT)
Dept: OUTPATIENT SERVICES | Facility: HOSPITAL | Age: 60
LOS: 1 days | End: 2025-09-05
Payer: COMMERCIAL

## 2025-09-05 DIAGNOSIS — Z98.890 OTHER SPECIFIED POSTPROCEDURAL STATES: Chronic | ICD-10-CM

## 2025-09-05 DIAGNOSIS — Z00.8 ENCOUNTER FOR OTHER GENERAL EXAMINATION: ICD-10-CM

## 2025-09-05 DIAGNOSIS — R92.8 OTHER ABNORMAL AND INCONCLUSIVE FINDINGS ON DIAGNOSTIC IMAGING OF BREAST: ICD-10-CM

## 2025-09-05 DIAGNOSIS — C50.912 MALIGNANT NEOPLASM OF UNSPECIFIED SITE OF LEFT FEMALE BREAST: ICD-10-CM

## 2025-09-05 PROCEDURE — 76642 ULTRASOUND BREAST LIMITED: CPT | Mod: 26,LT

## 2025-09-05 PROCEDURE — 76642 ULTRASOUND BREAST LIMITED: CPT

## 2025-09-16 ENCOUNTER — APPOINTMENT (OUTPATIENT)
Dept: ULTRASOUND IMAGING | Facility: IMAGING CENTER | Age: 60
End: 2025-09-16
Payer: COMMERCIAL

## 2025-09-16 ENCOUNTER — RESULT REVIEW (OUTPATIENT)
Age: 60
End: 2025-09-16

## 2025-09-16 PROCEDURE — 77065 DX MAMMO INCL CAD UNI: CPT | Mod: 26,LT

## 2025-09-16 PROCEDURE — 88305 TISSUE EXAM BY PATHOLOGIST: CPT | Mod: 26

## 2025-09-16 PROCEDURE — 19083 BX BREAST 1ST LESION US IMAG: CPT | Mod: LT

## 2025-09-19 ENCOUNTER — APPOINTMENT (OUTPATIENT)
Dept: PLASTIC SURGERY | Facility: CLINIC | Age: 60
End: 2025-09-19
Payer: COMMERCIAL

## 2025-09-19 VITALS
HEART RATE: 77 BPM | HEIGHT: 68 IN | SYSTOLIC BLOOD PRESSURE: 128 MMHG | DIASTOLIC BLOOD PRESSURE: 82 MMHG | WEIGHT: 140 LBS | OXYGEN SATURATION: 97 % | BODY MASS INDEX: 21.22 KG/M2 | TEMPERATURE: 98.2 F

## 2025-09-19 DIAGNOSIS — C50.912 MALIGNANT NEOPLASM OF UNSPECIFIED SITE OF LEFT FEMALE BREAST: ICD-10-CM

## 2025-09-19 PROCEDURE — 99213 OFFICE O/P EST LOW 20 MIN: CPT
